# Patient Record
Sex: MALE | Race: WHITE | NOT HISPANIC OR LATINO | Employment: OTHER | URBAN - NONMETROPOLITAN AREA
[De-identification: names, ages, dates, MRNs, and addresses within clinical notes are randomized per-mention and may not be internally consistent; named-entity substitution may affect disease eponyms.]

---

## 2017-01-20 ENCOUNTER — OFFICE VISIT (OUTPATIENT)
Dept: CARDIOLOGY | Facility: CLINIC | Age: 72
End: 2017-01-20

## 2017-01-20 VITALS
HEIGHT: 70 IN | DIASTOLIC BLOOD PRESSURE: 78 MMHG | BODY MASS INDEX: 23.91 KG/M2 | SYSTOLIC BLOOD PRESSURE: 116 MMHG | WEIGHT: 167 LBS | HEART RATE: 52 BPM

## 2017-01-20 DIAGNOSIS — R00.1 SEVERE SINUS BRADYCARDIA: ICD-10-CM

## 2017-01-20 DIAGNOSIS — Z45.010 PACEMAKER BATTERY DEPLETION: Primary | ICD-10-CM

## 2017-01-20 PROCEDURE — 93288 INTERROG EVL PM/LDLS PM IP: CPT | Performed by: INTERNAL MEDICINE

## 2017-01-20 RX ORDER — NIACIN 250 MG
250 TABLET, EXTENDED RELEASE ORAL DAILY
COMMUNITY

## 2017-01-20 NOTE — PROGRESS NOTES
Chief Complaint(s): PM at JOE    History of Present Illness:    The acute complicated chief complaint(s) first occurred in last few months,  is expected in nature, first stage in severity, expected in time of occurrence, happening once times, lasting motnhs at a time, and has been medicated with nothing new, and manifest as a feeling of nothing. It is not worsened with exertion and is not relieved with rest.  It is also occurring in the setting of concomitant severe sinus node dysfunction.      Patient Active Problem List   Diagnosis   • Severe sinus bradycardia   • Pacemaker          Current Outpatient Prescriptions:   •  aspirin 81 MG EC tablet, Take 81 mg by mouth daily., Disp: , Rfl:   •  Multiple Vitamins-Minerals (MULTIVITAMIN ADULT PO), Take  by mouth., Disp: , Rfl:   •  Niacin ER (SLO-NIACIN) 250 MG tablet controlled-release, Take 250 mg by mouth Daily., Disp: , Rfl:   •  Omega-3 Fatty Acids (FISH OIL) 1000 MG capsule capsule, Take  by mouth daily with breakfast., Disp: , Rfl:   No Known Allergies     PFSH:    Denies any alcohol, caffeine or tobacco abuse      ROS:    Cardiov: Denies chest pain, tightness, palpitations, SANDERSON, PND, or edema  Respiratory: Denies dyspnea, cough, hemoptysis, pleuritic chest pain, wheezing or sputum production    Vitals:    01/20/17 1304   BP: 116/78   Pulse: 52   R:                       17    Physical Exam   Pulmonary/Chest:            Lungs: CTA, no wheezing, equal air entry bilaterally, resonant to percussion, PMI non-displaced  Ext: warm, negative edema, all pulses normal     Diagnosis Plan   1. Pacemaker battery depletion  Will plan on generator change in near future   2. Severe sinus bradycardia          The patient's old records including ambulatory rhythm recordings (ECGs, Holter/event monitor) were reviewed and discussed.          Device Check (PM)    Company BTK  Mode DDD-CLS  Lower Rate 50 bpm  Upper rate 130 bpm         Thresholds    % pacing 92  Atrial Pacing 1  Volts @ 0.4 ms  Atrial Sensing 6.2 mV  Atrial Impedence 593 Ohms    % pacing 100  Right Ventricular Pacing 0.6 Volts @ 0.4 ms  Right Ventricular Sensing DEPENDENT mV  Right Ventricular Impedence 628 Ohms      Battery Voltage 2.64 Volts  Longevity JOE    Episodes 0    Reprogramming 0    Comments NORMAL FUNCTION

## 2017-01-24 RX ORDER — CHLORHEXIDINE GLUCONATE 4 G/100ML
SOLUTION TOPICAL DAILY
Qty: 118 ML | Refills: 0 | Status: SHIPPED | OUTPATIENT
Start: 2017-01-24 | End: 2017-01-31

## 2017-01-25 ENCOUNTER — PREP FOR SURGERY (OUTPATIENT)
Dept: CARDIOLOGY | Facility: CLINIC | Age: 72
End: 2017-01-25

## 2017-01-25 DIAGNOSIS — R00.1 BRADYCARDIA: Primary | ICD-10-CM

## 2017-01-25 RX ORDER — SODIUM CHLORIDE 0.9 % (FLUSH) 0.9 %
1-10 SYRINGE (ML) INJECTION AS NEEDED
Status: CANCELLED | OUTPATIENT
Start: 2017-01-25

## 2017-01-31 ENCOUNTER — APPOINTMENT (OUTPATIENT)
Dept: PREADMISSION TESTING | Facility: HOSPITAL | Age: 72
End: 2017-01-31

## 2017-01-31 DIAGNOSIS — R00.1 BRADYCARDIA: ICD-10-CM

## 2017-01-31 LAB
ANION GAP SERPL CALCULATED.3IONS-SCNC: 5 MMOL/L (ref 3–11)
BASOPHILS # BLD AUTO: 0.02 10*3/MM3 (ref 0–0.2)
BASOPHILS NFR BLD AUTO: 0.2 % (ref 0–1)
BUN BLD-MCNC: 19 MG/DL (ref 9–23)
BUN/CREAT SERPL: 19 (ref 7–25)
CALCIUM SPEC-SCNC: 10.1 MG/DL (ref 8.7–10.4)
CHLORIDE SERPL-SCNC: 103 MMOL/L (ref 99–109)
CO2 SERPL-SCNC: 30 MMOL/L (ref 20–31)
CREAT BLD-MCNC: 1 MG/DL (ref 0.6–1.3)
DEPRECATED RDW RBC AUTO: 47.2 FL (ref 37–54)
EOSINOPHIL # BLD AUTO: 0.22 10*3/MM3 (ref 0.1–0.3)
EOSINOPHIL NFR BLD AUTO: 2.7 % (ref 0–3)
ERYTHROCYTE [DISTWIDTH] IN BLOOD BY AUTOMATED COUNT: 13.4 % (ref 11.3–14.5)
GFR SERPL CREATININE-BSD FRML MDRD: 73 ML/MIN/1.73
GLUCOSE BLD-MCNC: 98 MG/DL (ref 70–100)
HCT VFR BLD AUTO: 44.6 % (ref 38.9–50.9)
HGB BLD-MCNC: 15.3 G/DL (ref 13.1–17.5)
IMM GRANULOCYTES # BLD: 0.03 10*3/MM3 (ref 0–0.03)
IMM GRANULOCYTES NFR BLD: 0.4 % (ref 0–0.6)
INR PPP: 1.03
LYMPHOCYTES # BLD AUTO: 1.91 10*3/MM3 (ref 0.6–4.8)
LYMPHOCYTES NFR BLD AUTO: 23.8 % (ref 24–44)
MCH RBC QN AUTO: 33 PG (ref 27–31)
MCHC RBC AUTO-ENTMCNC: 34.3 G/DL (ref 32–36)
MCV RBC AUTO: 96.3 FL (ref 80–99)
MONOCYTES # BLD AUTO: 0.46 10*3/MM3 (ref 0–1)
MONOCYTES NFR BLD AUTO: 5.7 % (ref 0–12)
NEUTROPHILS # BLD AUTO: 5.38 10*3/MM3 (ref 1.5–8.3)
NEUTROPHILS NFR BLD AUTO: 67.2 % (ref 41–71)
PLATELET # BLD AUTO: 234 10*3/MM3 (ref 150–450)
PMV BLD AUTO: 10.1 FL (ref 6–12)
POTASSIUM BLD-SCNC: 4.4 MMOL/L (ref 3.5–5.5)
PROTHROMBIN TIME: 11.2 SECONDS (ref 9.6–11.5)
RBC # BLD AUTO: 4.63 10*6/MM3 (ref 4.2–5.76)
SODIUM BLD-SCNC: 138 MMOL/L (ref 132–146)
WBC NRBC COR # BLD: 8.02 10*3/MM3 (ref 3.5–10.8)

## 2017-01-31 PROCEDURE — 85610 PROTHROMBIN TIME: CPT | Performed by: PHYSICIAN ASSISTANT

## 2017-01-31 PROCEDURE — 85025 COMPLETE CBC W/AUTO DIFF WBC: CPT | Performed by: PHYSICIAN ASSISTANT

## 2017-01-31 PROCEDURE — 36415 COLL VENOUS BLD VENIPUNCTURE: CPT

## 2017-01-31 PROCEDURE — 80048 BASIC METABOLIC PNL TOTAL CA: CPT | Performed by: PHYSICIAN ASSISTANT

## 2017-02-01 ENCOUNTER — HOSPITAL ENCOUNTER (OUTPATIENT)
Facility: HOSPITAL | Age: 72
Discharge: HOME | End: 2017-02-01
Attending: INTERNAL MEDICINE | Admitting: INTERNAL MEDICINE

## 2017-02-01 VITALS
BODY MASS INDEX: 25.44 KG/M2 | RESPIRATION RATE: 7 BRPM | HEART RATE: 51 BPM | WEIGHT: 177.69 LBS | HEIGHT: 70 IN | DIASTOLIC BLOOD PRESSURE: 99 MMHG | OXYGEN SATURATION: 95 % | SYSTOLIC BLOOD PRESSURE: 140 MMHG | TEMPERATURE: 97.9 F

## 2017-02-01 PROBLEM — I48.3 TYPICAL ATRIAL FLUTTER (HCC): Status: ACTIVE | Noted: 2017-02-01

## 2017-02-01 PROBLEM — Z45.010 PACEMAKER AT END OF BATTERY LIFE: Status: ACTIVE | Noted: 2017-02-01

## 2017-02-01 LAB
ANION GAP SERPL CALCULATED.3IONS-SCNC: 0 MMOL/L (ref 3–11)
BUN BLD-MCNC: 17 MG/DL (ref 9–23)
BUN/CREAT SERPL: 18.9 (ref 7–25)
CALCIUM SPEC-SCNC: 8.9 MG/DL (ref 8.7–10.4)
CHLORIDE SERPL-SCNC: 111 MMOL/L (ref 99–109)
CO2 SERPL-SCNC: 29 MMOL/L (ref 20–31)
CREAT BLD-MCNC: 0.9 MG/DL (ref 0.6–1.3)
DEPRECATED RDW RBC AUTO: 47.2 FL (ref 37–54)
ERYTHROCYTE [DISTWIDTH] IN BLOOD BY AUTOMATED COUNT: 13.3 % (ref 11.3–14.5)
GFR SERPL CREATININE-BSD FRML MDRD: 83 ML/MIN/1.73
GLUCOSE BLD-MCNC: 102 MG/DL (ref 70–100)
HCT VFR BLD AUTO: 41.6 % (ref 38.9–50.9)
HGB BLD-MCNC: 14.3 G/DL (ref 13.1–17.5)
MCH RBC QN AUTO: 33.1 PG (ref 27–31)
MCHC RBC AUTO-ENTMCNC: 34.4 G/DL (ref 32–36)
MCV RBC AUTO: 96.3 FL (ref 80–99)
PLATELET # BLD AUTO: 216 10*3/MM3 (ref 150–450)
PMV BLD AUTO: 10 FL (ref 6–12)
POTASSIUM BLD-SCNC: 4.2 MMOL/L (ref 3.5–5.5)
RBC # BLD AUTO: 4.32 10*6/MM3 (ref 4.2–5.76)
SODIUM BLD-SCNC: 140 MMOL/L (ref 132–146)
WBC NRBC COR # BLD: 5.83 10*3/MM3 (ref 3.5–10.8)

## 2017-02-01 PROCEDURE — 25010000002 VANCOMYCIN HCL IN NACL 1.25-0.9 GM/250ML-% SOLUTION: Performed by: INTERNAL MEDICINE

## 2017-02-01 PROCEDURE — 25010000002 ONDANSETRON PER 1 MG: Performed by: INTERNAL MEDICINE

## 2017-02-01 PROCEDURE — 80048 BASIC METABOLIC PNL TOTAL CA: CPT | Performed by: INTERNAL MEDICINE

## 2017-02-01 PROCEDURE — 85027 COMPLETE CBC AUTOMATED: CPT | Performed by: INTERNAL MEDICINE

## 2017-02-01 PROCEDURE — 25010000002 FENTANYL CITRATE (PF) 100 MCG/2ML SOLUTION: Performed by: INTERNAL MEDICINE

## 2017-02-01 PROCEDURE — 99152 MOD SED SAME PHYS/QHP 5/>YRS: CPT | Performed by: INTERNAL MEDICINE

## 2017-02-01 PROCEDURE — C1785 PMKR, DUAL, RATE-RESP: HCPCS

## 2017-02-01 PROCEDURE — 33228 REMV&REPLC PM GEN DUAL LEAD: CPT | Performed by: INTERNAL MEDICINE

## 2017-02-01 PROCEDURE — 25010000002 MIDAZOLAM PER 1 MG: Performed by: INTERNAL MEDICINE

## 2017-02-01 DEVICE — IMPLANTABLE DEVICE
Type: IMPLANTABLE DEVICE | Site: CHEST WALL | Status: FUNCTIONAL
Brand: ETRINSA 8 DR-T

## 2017-02-01 RX ORDER — VANCOMYCIN HYDROCHLORIDE
15 ONCE
Status: COMPLETED | OUTPATIENT
Start: 2017-02-01 | End: 2017-02-01

## 2017-02-01 RX ORDER — SODIUM CHLORIDE 0.9 % (FLUSH) 0.9 %
1-10 SYRINGE (ML) INJECTION AS NEEDED
Status: DISCONTINUED | OUTPATIENT
Start: 2017-02-01 | End: 2017-02-01 | Stop reason: HOSPADM

## 2017-02-01 RX ORDER — LIDOCAINE HYDROCHLORIDE 10 MG/ML
INJECTION, SOLUTION INFILTRATION; PERINEURAL AS NEEDED
Status: DISCONTINUED | OUTPATIENT
Start: 2017-02-01 | End: 2017-02-01 | Stop reason: HOSPADM

## 2017-02-01 RX ORDER — FENTANYL CITRATE 50 UG/ML
INJECTION, SOLUTION INTRAMUSCULAR; INTRAVENOUS AS NEEDED
Status: DISCONTINUED | OUTPATIENT
Start: 2017-02-01 | End: 2017-02-01 | Stop reason: HOSPADM

## 2017-02-01 RX ORDER — ONDANSETRON 2 MG/ML
4 INJECTION INTRAMUSCULAR; INTRAVENOUS EVERY 6 HOURS PRN
Status: DISCONTINUED | OUTPATIENT
Start: 2017-02-01 | End: 2017-02-01 | Stop reason: HOSPADM

## 2017-02-01 RX ORDER — SODIUM CHLORIDE 9 MG/ML
INJECTION, SOLUTION INTRAVENOUS CONTINUOUS PRN
Status: DISCONTINUED | OUTPATIENT
Start: 2017-02-01 | End: 2017-02-01 | Stop reason: HOSPADM

## 2017-02-01 RX ORDER — BUPIVACAINE HYDROCHLORIDE 5 MG/ML
INJECTION, SOLUTION EPIDURAL; INTRACAUDAL AS NEEDED
Status: DISCONTINUED | OUTPATIENT
Start: 2017-02-01 | End: 2017-02-01 | Stop reason: HOSPADM

## 2017-02-01 RX ORDER — HYDROCODONE BITARTRATE AND ACETAMINOPHEN 10; 325 MG/1; MG/1
1 TABLET ORAL EVERY 4 HOURS PRN
Status: DISCONTINUED | OUTPATIENT
Start: 2017-02-01 | End: 2017-02-01 | Stop reason: HOSPADM

## 2017-02-01 RX ORDER — MIDAZOLAM HYDROCHLORIDE 1 MG/ML
INJECTION INTRAMUSCULAR; INTRAVENOUS AS NEEDED
Status: DISCONTINUED | OUTPATIENT
Start: 2017-02-01 | End: 2017-02-01 | Stop reason: HOSPADM

## 2017-02-01 RX ORDER — ONDANSETRON 2 MG/ML
INJECTION INTRAMUSCULAR; INTRAVENOUS AS NEEDED
Status: DISCONTINUED | OUTPATIENT
Start: 2017-02-01 | End: 2017-02-01 | Stop reason: HOSPADM

## 2017-02-01 RX ADMIN — VANCOMYCIN HYDROCHLORIDE 1250 MG: 1 INJECTION, POWDER, LYOPHILIZED, FOR SOLUTION INTRAVENOUS at 08:04

## 2017-02-01 NOTE — H&P
"7:17 AM                                        Updated H & P         CC: PM at JOE, no new complaints since visit of 1/20/17    Visit Vitals   • /95 (BP Location: Right arm, Patient Position: Lying)  Comment: 134/94  LEFT ARM   • Pulse 61   • Temp 97.9 °F (36.6 °C) (Temporal Artery )   • Resp 20   • Ht 70\" (177.8 cm)   • Wt 177 lb 11.1 oz (80.6 kg)   • SpO2 98%   • BMI 25.5 kg/m2       Lungs: CTA no wheezing, equal air entry bilaterally    Chest: no erythema or swelling at PM site    Cor: RRR, physiologic S12, no rubs, gallops or murmurs    Ext: - edema, distal pulses 2+      Impression       Hospital Problem List     * (Principal)Pacemaker at end of battery life    Severe sinus bradycardia                  Plan     We will replace the PM generator.            Progress Notes  Encounter Date: 1/20/2017  Jah Mendoza MD   Cardiology      []Hide copied text  []Hover for attribution information     Chief Complaint(s): PM at JOE     History of Present Illness:     The acute complicated chief complaint(s) first occurred in last few months, is expected in nature, first stage in severity, expected in time of occurrence, happening once times, lasting motnhs at a time, and has been medicated with nothing new, and manifest as a feeling of nothing. It is not worsened with exertion and is not relieved with rest. It is also occurring in the setting of concomitant severe sinus node dysfunction.            Patient Active Problem List   Diagnosis   • Severe sinus bradycardia   • Pacemaker            Current Outpatient Prescriptions:   • aspirin 81 MG EC tablet, Take 81 mg by mouth daily., Disp: , Rfl:   • Multiple Vitamins-Minerals (MULTIVITAMIN ADULT PO), Take by mouth., Disp: , Rfl:   • Niacin ER (SLO-NIACIN) 250 MG tablet controlled-release, Take 250 mg by mouth Daily., Disp: , Rfl:   • Omega-3 Fatty Acids (FISH OIL) 1000 MG capsule capsule, Take by mouth daily with breakfast., Disp: , Rfl:   No Known Allergies "      PFSH:     Denies any alcohol, caffeine or tobacco abuse        ROS:     Cardiov: Denies chest pain, tightness, palpitations, SANDERSON, PND, or edema  Respiratory: Denies dyspnea, cough, hemoptysis, pleuritic chest pain, wheezing or sputum production         Vitals:     01/20/17 1304   BP: 116/78   Pulse: 52   R: 17     Physical Exam   Pulmonary/Chest:          HEENT: NC, AT, PERRLA, EOMI  Lungs: CTA, no wheezing, equal air entry bilaterally, resonant to percussion, PMI non-displaced  Cor: RRR, physiol S12  Chest: No erythema erosion or swelling at PM site  Ext: warm, negative edema, all pulses normal  Neuro: AAO X 3, CN II-XII grossly intact and equal bilat.       Diagnosis Plan   1. Pacemaker battery depletion  Will plan on generator change in near future   2. Severe sinus bradycardia         The patient's old records including ambulatory rhythm recordings (ECGs, Holter/event monitor) were reviewed and discussed.         Device Check (PM)     Company BTK  Mode DDD-CLS  Lower Rate 50 bpm  Upper rate 130 bpm        Thresholds     % pacing 92  Atrial Pacing 1 Volts @ 0.4 ms  Atrial Sensing 6.2 mV  Atrial Impedence 593 Ohms     % pacing 100  Right Ventricular Pacing 0.6 Volts @ 0.4 ms  Right Ventricular Sensing DEPENDENT mV  Right Ventricular Impedence 628 Ohms        Battery Voltage 2.64 Volts  Longevity JOE     Episodes 0     Reprogramming 0     Comments NORMAL FUNCTION

## 2017-02-08 ENCOUNTER — OFFICE VISIT (OUTPATIENT)
Dept: CARDIOLOGY | Facility: CLINIC | Age: 72
End: 2017-02-08

## 2017-02-08 DIAGNOSIS — I48.3 TYPICAL ATRIAL FLUTTER (HCC): ICD-10-CM

## 2017-02-08 DIAGNOSIS — R00.1 SEVERE SINUS BRADYCARDIA: Primary | ICD-10-CM

## 2017-02-08 PROCEDURE — 99024 POSTOP FOLLOW-UP VISIT: CPT | Performed by: INTERNAL MEDICINE

## 2017-02-08 NOTE — PROGRESS NOTES
WOUND CHECK    2017    Tarun Salgado, : 1945    WOUND CHECK    B/P: 146/101 (Sitting)  (Standing)     Pulse: 60    Patient has fever: [] Temperature if indicated: 97.5 degrees F    Wound Location: left infraclavicular     Dressing Removed [x]        Old Dressing Appearance:  Clean, dry [x]                 Old, bloody drainage []                            Moist, serous drainage []                Moist, thick yellow/green drainage []       Wound Appearance: Redness []                  Drainage []                  Culture obtained []        Color: N/A     Consistency: none     Amount: none         Gloves used, wound cleansed with sterile 4x4 and peroxide [x]       MD notified [] MD orders:   Antibiotic started []  If checked, type   Other: pt was instructed to monitor BP and contact PCP if not better    Appointment for follow-up scheduled for 3 months post procedure []    Future Appointments  Date Time Provider Department Center   2017 10:00 AM Jah Mendoza MD [facility] None           Qian Maradiaga MA, 17      MD Signature:______________________________ Completed By/Date:

## 2017-04-21 ENCOUNTER — OFFICE VISIT (OUTPATIENT)
Dept: CARDIOLOGY | Facility: CLINIC | Age: 72
End: 2017-04-21

## 2017-04-21 VITALS — DIASTOLIC BLOOD PRESSURE: 70 MMHG | SYSTOLIC BLOOD PRESSURE: 130 MMHG | HEART RATE: 62 BPM

## 2017-04-21 DIAGNOSIS — I42.9 CARDIOMYOPATHY (HCC): Primary | ICD-10-CM

## 2017-04-21 PROBLEM — R00.1 SEVERE SINUS BRADYCARDIA: Status: ACTIVE | Noted: 2017-04-21

## 2017-04-21 PROBLEM — I48.3 TYPICAL ATRIAL FLUTTER (HCC): Status: ACTIVE | Noted: 2017-04-21

## 2017-04-21 PROCEDURE — 99024 POSTOP FOLLOW-UP VISIT: CPT | Performed by: INTERNAL MEDICINE

## 2017-04-21 NOTE — PROGRESS NOTES
WOUND CHECK    2017    Tarun Salgado, : 1945    Jah Mendoza MD    B/P:  (Sitting) 130 70 (Standing)     Pulse: 62    Patient has fever: [] Temperature if indicated: No fevers noted per patient    Wound Location: left infraclavicular    Dressing Removed []   No dressing present     Old Dressing Appearance:  Clean, dry [x]                 Old, bloody drainage []                            Moist, serous drainage []                Moist, thick yellow/green drainage []       Wound Appearance: Redness []   none               Drainage []   none               Culture obtained []   no     Color:      Consistency:      Amount: none         Gloves used, wound cleansed with sterile 4x4 and peroxide [x]       MD notified [] MD orders:     Antibiotic started []  If checked, type   Other: Patient had his device placed on 17. His incision is completely healed. No redness, fevers, swelling or streaking. He is complaining that this device is sticking out more than the previous one. I explained to him that it looks normal and since it is his second device it may been seen a little more than the previous one. I instructed him to let us know if anything changes and to keep his scheduled f/u appointment in . He denies any issues. He just thought it soul look better by now.    Appointment for follow-up scheduled for 3 months post procedure [x]    Future Appointments  Date Time Provider Department Center   2017 10:00 AM Jah Mendoza MD [facility] None           Tiffanie rCamer RN, 17      MD Signature:______________________________ Completed By/Date:

## 2017-09-08 ENCOUNTER — OFFICE VISIT (OUTPATIENT)
Dept: CARDIOLOGY | Facility: CLINIC | Age: 72
End: 2017-09-08

## 2017-09-08 VITALS
HEIGHT: 70 IN | SYSTOLIC BLOOD PRESSURE: 131 MMHG | HEART RATE: 73 BPM | BODY MASS INDEX: 23.62 KG/M2 | DIASTOLIC BLOOD PRESSURE: 76 MMHG | WEIGHT: 165 LBS

## 2017-09-08 DIAGNOSIS — I48.0 PAROXYSMAL ATRIAL FIBRILLATION (HCC): Primary | ICD-10-CM

## 2017-09-08 DIAGNOSIS — I44.2 COMPLETE HEART BLOCK (HCC): ICD-10-CM

## 2017-09-08 DIAGNOSIS — I48.0 PAROXYSMAL ATRIAL FIBRILLATION (HCC): ICD-10-CM

## 2017-09-08 DIAGNOSIS — R00.1 SEVERE SINUS BRADYCARDIA: Primary | ICD-10-CM

## 2017-09-08 PROCEDURE — 99214 OFFICE O/P EST MOD 30 MIN: CPT | Performed by: INTERNAL MEDICINE

## 2017-09-08 PROCEDURE — 93288 INTERROG EVL PM/LDLS PM IP: CPT | Performed by: INTERNAL MEDICINE

## 2017-09-08 NOTE — PROGRESS NOTES
"Tarun Salgado  1945  [phone]      09/08/2017    [facility] CARDIOLOGY     Keo Ness MD  [address] [place], [place] [id number]    Chief Complaint   Patient presents with   • Slow Heart Rate       Problem List:   1. Severe Bradycardia/CHB   A. PPM implant   B. Generator change 2/1/17 - Dr. Mendoza (Biotronik)  2. Paroxysmal Atrial Fibrillation   A. CHADSVasc = 1      Allergies  No Known Allergies    Current Medications    Current Outpatient Prescriptions:   •  aspirin 81 MG EC tablet, Take 81 mg by mouth daily., Disp: , Rfl:   •  Multiple Vitamins-Minerals (MULTIVITAMIN ADULT PO), Take  by mouth., Disp: , Rfl:   •  Niacin ER (SLO-NIACIN) 250 MG tablet controlled-release, Take 250 mg by mouth Daily., Disp: , Rfl:   •  Omega-3 Fatty Acids (FISH OIL) 1000 MG capsule capsule, Take 2,400 mg by mouth Daily With Breakfast., Disp: , Rfl:     History of Present Illness   HPI    Pt presents for follow up of bradycardia with PM check. He is a previous patient of Dr. Mendoza with history of CHB. He recently had a generator change in February. Since then, pt denies any palpitations , SOB, CP, LH, and dizziness. Denies any hospitalizations, ER visits, bleeding, or TIA/CVA symptoms. Overall feels well. He exercises reguarly. Today, on his device check, it shows that he has 4% afib, which is a new diagnosis for him. He did not feel any palpations. He denies history CAD, CVA, DM, HTN.     ROS:  General:  Denies fatigue, weight gain or loss  Cardiovascular:  Denies CP, PND, syncope, near syncope, edema or palpitations.  Pulmonary:  Denies SANDERSON, cough, or wheezing      Vitals:    09/08/17 1149   BP: 131/76   BP Location: Left arm   Patient Position: Sitting   Pulse: 73   Weight: 165 lb (74.8 kg)   Height: 70\" (177.8 cm)     PE:  General: NAD  Neck: no JVD, no carotid bruits, no TM  Heart RRR, NL S1, S2, S4 present, no rubs, murmurs  Lungs: CTA, no wheezes, rhonchi, or rales  Abd: " soft, non-tender, NL BS  Ext: No musculoskeletal deformities, no edema, cyanosis, or clubbing  Psych: normal mood and affect    Diagnostic Data:    PM check: normal function, 100% V paced. 4% afib Longest episode 4 hours. Patient asymptomatic    Procedures    1. Severe sinus bradycardia    2. Complete heart block    3. Paroxysmal atrial fibrillation          Plan:  1) PAF- 4% mode switched. Asymptomatic. Also, would check echocardiogram due to new onset atrial fibrillation to evaluate for LV function and valvular abnormalities.   Continue present medications.   2) Anticoagulation CHADsvasc = 1: 2.8% per year stroke risk. Dr. Alexander had a long discussion with the patient on anticoagulation. He recommends to start on Eliquis 5 mg BID. However, the patient is hesitant about taking blood thinners at this time. He will stick with ASA for now and let us know if he decides differently. He understands risks of not being on a blood thinner.   3) CHB - normal PM funciton  Wt loss, exercise, salt reduction    F/up in 6 months    Scribed for Greg Alexander MD by Palak Melchor PA-C. 9/8/2017  12:28 PM     IGreg MD, personally performed the services described in this documentation as scribed by the above named individual in my presence, and it is both accurate and complete.  9/8/2017  12:29 PM

## 2017-09-25 ENCOUNTER — HOSPITAL ENCOUNTER (OUTPATIENT)
Dept: CARDIOLOGY | Facility: HOSPITAL | Age: 72
Discharge: HOME | End: 2017-09-25
Attending: INTERNAL MEDICINE | Admitting: INTERNAL MEDICINE

## 2017-09-25 VITALS
DIASTOLIC BLOOD PRESSURE: 98 MMHG | BODY MASS INDEX: 23.62 KG/M2 | WEIGHT: 165 LBS | HEIGHT: 70 IN | SYSTOLIC BLOOD PRESSURE: 140 MMHG

## 2017-09-25 DIAGNOSIS — I48.0 PAROXYSMAL ATRIAL FIBRILLATION (HCC): ICD-10-CM

## 2017-09-25 LAB
BH CV ECHO MEAS - AO MAX PG (FULL): -0.22 MMHG
BH CV ECHO MEAS - AO MAX PG: 7.5 MMHG
BH CV ECHO MEAS - AO MEAN PG (FULL): 0 MMHG
BH CV ECHO MEAS - AO MEAN PG: 4 MMHG
BH CV ECHO MEAS - AO ROOT AREA (BSA CORRECTED): 1.8
BH CV ECHO MEAS - AO ROOT AREA: 9.1 CM^2
BH CV ECHO MEAS - AO ROOT DIAM: 3.4 CM
BH CV ECHO MEAS - AO V2 MAX: 137 CM/SEC
BH CV ECHO MEAS - AO V2 MEAN: 97.5 CM/SEC
BH CV ECHO MEAS - AO V2 VTI: 28 CM
BH CV ECHO MEAS - AVA(I,A): 3.2 CM^2
BH CV ECHO MEAS - AVA(I,D): 3.2 CM^2
BH CV ECHO MEAS - AVA(V,A): 3.2 CM^2
BH CV ECHO MEAS - AVA(V,D): 3.2 CM^2
BH CV ECHO MEAS - BSA(HAYCOCK): 1.9 M^2
BH CV ECHO MEAS - BSA: 1.9 M^2
BH CV ECHO MEAS - BZI_BMI: 23.7 KILOGRAMS/M^2
BH CV ECHO MEAS - BZI_METRIC_HEIGHT: 177.8 CM
BH CV ECHO MEAS - BZI_METRIC_WEIGHT: 74.8 KG
BH CV ECHO MEAS - CONTRAST EF (2CH): 59.5 ML/M^2
BH CV ECHO MEAS - CONTRAST EF 4CH: 54.4 ML/M^2
BH CV ECHO MEAS - EDV(CUBED): 88.1 ML
BH CV ECHO MEAS - EDV(MOD-SP2): 116 ML
BH CV ECHO MEAS - EDV(MOD-SP4): 136 ML
BH CV ECHO MEAS - EDV(TEICH): 90.1 ML
BH CV ECHO MEAS - EF(CUBED): 50.5 %
BH CV ECHO MEAS - EF(MOD-SP2): 59.5 %
BH CV ECHO MEAS - EF(MOD-SP4): 54 %
BH CV ECHO MEAS - EF(TEICH): 42.7 %
BH CV ECHO MEAS - ESV(CUBED): 43.6 ML
BH CV ECHO MEAS - ESV(MOD-SP2): 47 ML
BH CV ECHO MEAS - ESV(MOD-SP4): 62 ML
BH CV ECHO MEAS - ESV(TEICH): 51.6 ML
BH CV ECHO MEAS - FS: 20.9 %
BH CV ECHO MEAS - IVS/LVPW: 1.2
BH CV ECHO MEAS - IVSD: 1.3 CM
BH CV ECHO MEAS - LA DIMENSION: 3.9 CM
BH CV ECHO MEAS - LA/AO: 1.1
BH CV ECHO MEAS - LAT PEAK E' VEL: 10.1 CM/SEC
BH CV ECHO MEAS - LV DIASTOLIC VOL/BSA (35-75): 70.7 ML/M^2
BH CV ECHO MEAS - LV MASS(C)D: 201.8 GRAMS
BH CV ECHO MEAS - LV MASS(C)DI: 104.9 GRAMS/M^2
BH CV ECHO MEAS - LV MAX PG: 7.7 MMHG
BH CV ECHO MEAS - LV MEAN PG: 4 MMHG
BH CV ECHO MEAS - LV SYSTOLIC VOL/BSA (12-30): 32.2 ML/M^2
BH CV ECHO MEAS - LV V1 MAX: 139 CM/SEC
BH CV ECHO MEAS - LV V1 MEAN: 93 CM/SEC
BH CV ECHO MEAS - LV V1 VTI: 28.5 CM
BH CV ECHO MEAS - LVIDD: 4.5 CM
BH CV ECHO MEAS - LVIDS: 3.5 CM
BH CV ECHO MEAS - LVLD AP2: 8.4 CM
BH CV ECHO MEAS - LVLD AP4: 9.2 CM
BH CV ECHO MEAS - LVLS AP2: 7.6 CM
BH CV ECHO MEAS - LVLS AP4: 7.6 CM
BH CV ECHO MEAS - LVOT AREA (M): 3.1 CM^2
BH CV ECHO MEAS - LVOT AREA: 3.1 CM^2
BH CV ECHO MEAS - LVOT DIAM: 2 CM
BH CV ECHO MEAS - LVPWD: 1.3 CM
BH CV ECHO MEAS - MED PEAK E' VEL: 5.46 CM/SEC
BH CV ECHO MEAS - MV A MAX VEL: 41.3 CM/SEC
BH CV ECHO MEAS - MV E MAX VEL: 65.4 CM/SEC
BH CV ECHO MEAS - MV E/A: 1.6
BH CV ECHO MEAS - PA ACC SLOPE: 644 CM/SEC^2
BH CV ECHO MEAS - PA ACC TIME: 0.13 SEC
BH CV ECHO MEAS - PA MAX PG: 3.4 MMHG
BH CV ECHO MEAS - PA PR(ACCEL): 20.5 MMHG
BH CV ECHO MEAS - PA V2 MAX: 92.3 CM/SEC
BH CV ECHO MEAS - SI(AO): 132.2 ML/M^2
BH CV ECHO MEAS - SI(CUBED): 23.1 ML/M^2
BH CV ECHO MEAS - SI(LVOT): 46.5 ML/M^2
BH CV ECHO MEAS - SI(MOD-SP2): 35.9 ML/M^2
BH CV ECHO MEAS - SI(MOD-SP4): 38.5 ML/M^2
BH CV ECHO MEAS - SI(TEICH): 20 ML/M^2
BH CV ECHO MEAS - SV(AO): 254.2 ML
BH CV ECHO MEAS - SV(CUBED): 44.5 ML
BH CV ECHO MEAS - SV(LVOT): 89.5 ML
BH CV ECHO MEAS - SV(MOD-SP2): 69 ML
BH CV ECHO MEAS - SV(MOD-SP4): 74 ML
BH CV ECHO MEAS - SV(TEICH): 38.5 ML
BH CV ECHO MEAS - TAPSE (>1.6): 2.7 CM2
BH CV VAS BP RIGHT ARM: NORMAL MMHG
BH CV XLRA - RV BASE: 3.8 CM
BH CV XLRA - RV LENGTH: 9 CM
BH CV XLRA - RV MID: 3.3 CM
BH CV XLRA - TDI S': 13.2 CM/SEC
E/E' RATIO: 6.5
LEFT ATRIUM VOLUME INDEX: 39.1 ML/M2
LV EF 2D ECHO EST: 46 %

## 2017-09-25 PROCEDURE — 93306 TTE W/DOPPLER COMPLETE: CPT

## 2017-09-25 PROCEDURE — 93306 TTE W/DOPPLER COMPLETE: CPT | Performed by: INTERNAL MEDICINE

## 2017-09-28 ENCOUNTER — TELEPHONE (OUTPATIENT)
Dept: CARDIOLOGY | Facility: CLINIC | Age: 72
End: 2017-09-28

## 2017-10-02 DIAGNOSIS — R06.02 SHORTNESS OF BREATH: ICD-10-CM

## 2017-10-02 DIAGNOSIS — Z95.0 PACEMAKER: ICD-10-CM

## 2017-10-02 DIAGNOSIS — I48.0 PAROXYSMAL ATRIAL FIBRILLATION (HCC): Primary | ICD-10-CM

## 2017-10-02 DIAGNOSIS — I48.91 NEW ONSET A-FIB (HCC): ICD-10-CM

## 2017-10-02 DIAGNOSIS — I48.3 TYPICAL ATRIAL FLUTTER (HCC): ICD-10-CM

## 2017-10-02 DIAGNOSIS — I44.2 COMPLETE HEART BLOCK (HCC): ICD-10-CM

## 2017-10-02 DIAGNOSIS — R00.1 SEVERE SINUS BRADYCARDIA: ICD-10-CM

## 2017-10-25 ENCOUNTER — HOSPITAL ENCOUNTER (OUTPATIENT)
Dept: CARDIOLOGY | Facility: HOSPITAL | Age: 72
Discharge: HOME | End: 2017-10-25
Attending: INTERNAL MEDICINE

## 2017-10-25 ENCOUNTER — HOSPITAL ENCOUNTER (OUTPATIENT)
Dept: CARDIOLOGY | Facility: HOSPITAL | Age: 72
Discharge: HOME | End: 2017-10-25
Attending: INTERNAL MEDICINE | Admitting: INTERNAL MEDICINE

## 2017-10-25 VITALS
BODY MASS INDEX: 23.62 KG/M2 | DIASTOLIC BLOOD PRESSURE: 80 MMHG | HEART RATE: 61 BPM | HEIGHT: 70 IN | SYSTOLIC BLOOD PRESSURE: 140 MMHG | WEIGHT: 165 LBS

## 2017-10-25 DIAGNOSIS — R06.02 SHORTNESS OF BREATH: ICD-10-CM

## 2017-10-25 LAB
BH CV STRESS BP STAGE 1: NORMAL
BH CV STRESS BP STAGE 2: NORMAL
BH CV STRESS BP STAGE 3: NORMAL
BH CV STRESS DURATION MIN STAGE 1: 3
BH CV STRESS DURATION MIN STAGE 2: 3
BH CV STRESS DURATION MIN STAGE 3: 3
BH CV STRESS DURATION MIN STAGE 4: 2
BH CV STRESS DURATION SEC STAGE 1: 0
BH CV STRESS DURATION SEC STAGE 2: 0
BH CV STRESS DURATION SEC STAGE 3: 0
BH CV STRESS DURATION SEC STAGE 4: 1
BH CV STRESS GRADE STAGE 1: 10
BH CV STRESS GRADE STAGE 2: 12
BH CV STRESS GRADE STAGE 3: 14
BH CV STRESS GRADE STAGE 4: 16
BH CV STRESS HR STAGE 1: 91
BH CV STRESS HR STAGE 2: 98
BH CV STRESS HR STAGE 3: 116
BH CV STRESS HR STAGE 4: 127
BH CV STRESS METS STAGE 1: 5
BH CV STRESS METS STAGE 2: 7.5
BH CV STRESS METS STAGE 3: 10
BH CV STRESS METS STAGE 4: 13.5
BH CV STRESS O2 STAGE 1: 97
BH CV STRESS O2 STAGE 2: 97
BH CV STRESS O2 STAGE 3: 98
BH CV STRESS O2 STAGE 4: 98
BH CV STRESS PROTOCOL 1: NORMAL
BH CV STRESS RECOVERY BP: NORMAL MMHG
BH CV STRESS RECOVERY HR: 65 BPM
BH CV STRESS RECOVERY O2: 97 %
BH CV STRESS SPEED STAGE 1: 1.7
BH CV STRESS SPEED STAGE 2: 2.5
BH CV STRESS SPEED STAGE 3: 3.4
BH CV STRESS SPEED STAGE 4: 4.2
BH CV STRESS STAGE 1: 1
BH CV STRESS STAGE 2: 2
BH CV STRESS STAGE 3: 3
BH CV STRESS STAGE 4: 4
LV EF NUC BP: 67 %
MAXIMAL PREDICTED HEART RATE: 146 BPM
PERCENT MAX PREDICTED HR: 93.15 %
STRESS BASELINE BP: NORMAL MMHG
STRESS BASELINE HR: 60 BPM
STRESS O2 SAT REST: 97 %
STRESS PERCENT HR: 110 %
STRESS POST ESTIMATED WORKLOAD: 13.4 METS
STRESS POST EXERCISE DUR MIN: 11 MIN
STRESS POST EXERCISE DUR SEC: 1 SEC
STRESS POST O2 SAT PEAK: 98 %
STRESS POST PEAK BP: NORMAL MMHG
STRESS POST PEAK HR: 136 BPM
STRESS TARGET HR: 124 BPM

## 2017-10-25 PROCEDURE — 0 TECHNETIUM SESTAMIBI: Performed by: INTERNAL MEDICINE

## 2017-10-25 PROCEDURE — A9500 TC99M SESTAMIBI: HCPCS | Performed by: INTERNAL MEDICINE

## 2017-10-25 PROCEDURE — 78452 HT MUSCLE IMAGE SPECT MULT: CPT

## 2017-10-25 PROCEDURE — 93017 CV STRESS TEST TRACING ONLY: CPT

## 2017-10-25 PROCEDURE — 93018 CV STRESS TEST I&R ONLY: CPT | Performed by: INTERNAL MEDICINE

## 2017-10-25 PROCEDURE — 78452 HT MUSCLE IMAGE SPECT MULT: CPT | Performed by: INTERNAL MEDICINE

## 2017-10-25 RX ADMIN — TECHNETIUM TC-99M SESTAMIBI 1 DOSE: 1 INJECTION INTRAVENOUS at 10:25

## 2017-10-25 RX ADMIN — TECHNETIUM TC-99M SESTAMIBI 1 DOSE: 1 INJECTION INTRAVENOUS at 12:22

## 2017-11-01 ENCOUNTER — TELEPHONE (OUTPATIENT)
Dept: CARDIOLOGY | Facility: CLINIC | Age: 72
End: 2017-11-01

## 2017-11-01 NOTE — TELEPHONE ENCOUNTER
Stress test is normal and pt aware. He wants to know if he can exercise with target Heart rate up to 150? Pt has PPM for severe sinus mike/ CHB and Paroxysmal Atrial Fibrillation. Anticoagulation CHADsvasc = 1, Pt refuses Eliquis at this time and wants to continue with ASA for now. Last PPM check showed 4% Afib with longest episode being 4 hrs. Please advise.

## 2018-03-16 ENCOUNTER — TELEPHONE (OUTPATIENT)
Dept: CARDIOLOGY | Facility: CLINIC | Age: 73
End: 2018-03-16

## 2018-03-23 ENCOUNTER — OFFICE VISIT (OUTPATIENT)
Dept: CARDIOLOGY | Facility: CLINIC | Age: 73
End: 2018-03-23

## 2018-03-23 VITALS
HEART RATE: 50 BPM | DIASTOLIC BLOOD PRESSURE: 80 MMHG | BODY MASS INDEX: 24.34 KG/M2 | HEIGHT: 70 IN | WEIGHT: 170 LBS | SYSTOLIC BLOOD PRESSURE: 120 MMHG

## 2018-03-23 DIAGNOSIS — I44.2 COMPLETE HEART BLOCK (HCC): Primary | ICD-10-CM

## 2018-03-23 DIAGNOSIS — I48.0 PAROXYSMAL ATRIAL FIBRILLATION (HCC): ICD-10-CM

## 2018-03-23 PROCEDURE — 93280 PM DEVICE PROGR EVAL DUAL: CPT | Performed by: INTERNAL MEDICINE

## 2018-03-23 PROCEDURE — 99213 OFFICE O/P EST LOW 20 MIN: CPT | Performed by: INTERNAL MEDICINE

## 2018-03-23 NOTE — PROGRESS NOTES
Tarun Salgado  1945  [phone]      03/23/2018    [facility] CARDIOLOGY     James Zuniga MD  [address] [place], [place] [address]    Chief Complaint   Patient presents with   • Slow Heart Rate         Problem List:   1. Severe Bradycardia/CHB              A. PPM implant              B. Generator change 2/1/17 - Dr. Mendoza (Biotronik)  2. Paroxysmal Atrial Fibrillation              A. CHADSVasc = 1   B. Echo 9/17: LVEF 48%, Mild MR/TR   C.Cardiolyte test 9/17: Abnormal myocardial perfusion scan showing fixed defect involving the basilar anteroseptal segment consistent with bundle branch block/pacing. Myocardial perfusion imaging   indicates no evidence of ischemia.Left ventricular ejection fraction is normal (Calculated EF = 67%).      Allergies  No Known Allergies    Current Medications    Current Outpatient Prescriptions:   •  aspirin 81 MG EC tablet, Take 81 mg by mouth daily., Disp: , Rfl:   •  Multiple Vitamins-Minerals (MULTIVITAMIN ADULT PO), Take  by mouth., Disp: , Rfl:   •  Niacin ER (SLO-NIACIN) 250 MG tablet controlled-release, Take 250 mg by mouth Daily., Disp: , Rfl:   •  Omega-3 Fatty Acids (FISH OIL) 1000 MG capsule capsule, Take 2,400 mg by mouth Daily With Breakfast., Disp: , Rfl:     History of Present Illness   HPI    Pt presents for follow up of bradycardia/AF. Since the pt has seen us in clinic last, pt denies any syncope, SOB, CP, LH, and dizziness. Denies any hospitalizations, ER visits, bleeding, or TIA/CVA symptoms. Overall feels well. Pt with a lot of questions regarding AF/results of echo and stress test.     ROS:  General:  Denies fatigue, weight gain or loss  Cardiovascular:  Denies CP, PND, syncope, near syncope, edema or palpitations.  Pulmonary:  Denies SANDERSON, cough, or wheezing    Vitals:    03/23/18 1053   BP: 120/80   BP Location: Left arm   Patient Position: Sitting   Pulse: 50   Weight: 77.1 kg (170 lb)   Height: 177.8 cm  "(70\")       PE:  General: NAD  Neck: no JVD, no carotid bruits, no TM  Heart RRR, NL S1, S2,  no rubs, murmurs  Lungs: CTA, no wheezes, rhonchi, or rales  Abd: soft, non-tender, NL BS  Ext: No musculoskeletal deformities, no edema, cyanosis, or clubbing  Psych: normal mood and affect    Diagnostic Data:  Procedures      1. Complete heart block    2. Paroxysmal atrial fibrillation        PM Interrogation: NL PM fxn, Nl battery fxn, 7% AF, 100% RV paced     Plan:  1) Bradycardia/CHB  Continue present medications. NL pacemaker function.  2) Anticoagulation will be CHADVASC 2 in 8/17  Long discussion with pt for need of NOAC. Pt with think about NOAC. Will stay on ASA for now      F/up in 8 months      "

## 2018-04-06 ENCOUNTER — TELEPHONE (OUTPATIENT)
Dept: CARDIOLOGY | Facility: CLINIC | Age: 73
End: 2018-04-06

## 2018-04-16 ENCOUNTER — TELEPHONE (OUTPATIENT)
Dept: CARDIOLOGY | Facility: CLINIC | Age: 73
End: 2018-04-16

## 2018-04-16 NOTE — TELEPHONE ENCOUNTER
----- Message from Tarun Salgado sent at 4/14/2018 11:04 AM EDT -----  Regarding: Referral Request  Contact: [phone]  Dr. Alexander,  Late Friday evening and the early morning on Saturday I spent at the [facility] in the Emergency Room and the Cardiac Unit.  Friday evening about 7:30pm my head started to pain very bad and when I tried to breath, my chest hurt.  I took 2 regular aspirin and after a couple of hours my chest got tighter and breathing was very difficult and hurt.  Breathing deep did help my head pain, but I could not handle the pain in my chest.  At 11:30pm, I went to the Emergency Room because the Walk-in Clinics were closed.  The Emergency Room Doctor took several blood samples and an X-ray of my lungs (to check for blood clots)  He had me taken the the Cardiac Unit.  There, Dr. Seay reviewed the X-ray and the  blood samples.  He said there was no indication of a blood clot and the cardiac markers (troponin) were slightly elevated at 0.169 (first one) and 0.167 (second one).  The d dimer (a marker for blood clot in my lungs) was negative at 0.36.  He let me  go home providing I contact you ASAP.  My breathing has improved but still hurts when I breath deep and the head pain is gone.  I have not taken Eliquis because [facility].  [facility] came [facility] and said they requested [facility] and I was last told that they had not received them.  Once I get the prescription, I will start taking Eliquis per your recommendation.  Please advise regarding an appointment.  Thank you.

## 2018-04-24 ENCOUNTER — TELEPHONE (OUTPATIENT)
Dept: CARDIOLOGY | Facility: CLINIC | Age: 73
End: 2018-04-24

## 2018-04-24 NOTE — TELEPHONE ENCOUNTER
Called pt per GFJAGDISH and [place] william on 4/14/2018. Pt to start on Eliquis 5 mg 1 BID. Pt aware and he is going to call [facility] and find out what [facility] number to send rx to and call us back.

## 2018-04-25 NOTE — TELEPHONE ENCOUNTER
They need documentation why pt can't take Pradaxa instead of Eliquis. I could not find any reactions to Pradaxa in the past. Please advise.

## 2018-04-26 NOTE — TELEPHONE ENCOUNTER
PT called back and said that what the [facility] told me about unable to get Eliquis is False. [facility]. He requested I call in the medication to [facility] in [place] until he gets it all straightened out.

## 2018-06-25 ENCOUNTER — TELEPHONE (OUTPATIENT)
Dept: CARDIOLOGY | Facility: CLINIC | Age: 73
End: 2018-06-25

## 2018-06-25 NOTE — TELEPHONE ENCOUNTER
----- Message from Tarun Salgado sent at 6/24/2018 10:40 AM EDT -----  Regarding: Non-Urgent Medical Question  Contact: [phone]  I have a large bruise on my left inter thigh that appears to be getting larger. I have had it since Mon., 6/18.  I am taking Pradaxa. What can I do to resolve it ?  Do I need an appointment?  Please advise. Thank you. Tarun Salgado. Ph: [phone]            I spoke with the patient on the phone. I answered all of his questions.

## 2018-10-09 ENCOUNTER — DOCUMENTATION (OUTPATIENT)
Dept: CARDIOLOGY | Facility: CLINIC | Age: 73
End: 2018-10-09

## 2018-12-14 ENCOUNTER — OFFICE VISIT (OUTPATIENT)
Dept: CARDIOLOGY | Facility: CLINIC | Age: 73
End: 2018-12-14

## 2018-12-14 VITALS
SYSTOLIC BLOOD PRESSURE: 124 MMHG | WEIGHT: 174 LBS | DIASTOLIC BLOOD PRESSURE: 84 MMHG | HEIGHT: 70 IN | HEART RATE: 60 BPM | BODY MASS INDEX: 24.91 KG/M2

## 2018-12-14 DIAGNOSIS — I48.20 CHRONIC ATRIAL FIBRILLATION (HCC): ICD-10-CM

## 2018-12-14 DIAGNOSIS — I44.2 COMPLETE HEART BLOCK (HCC): Primary | ICD-10-CM

## 2018-12-14 PROCEDURE — 93280 PM DEVICE PROGR EVAL DUAL: CPT | Performed by: INTERNAL MEDICINE

## 2018-12-14 PROCEDURE — 99213 OFFICE O/P EST LOW 20 MIN: CPT | Performed by: INTERNAL MEDICINE

## 2018-12-14 RX ORDER — DABIGATRAN ETEXILATE 150 MG/1
150 CAPSULE ORAL 2 TIMES DAILY
COMMUNITY

## 2018-12-14 NOTE — PROGRESS NOTES
Tarun Salgado  1945  [phone]      12/14/2018      [facility] CARDIOLOGY     James Zuniga MD  [address] [place], [place] [id number]    Chief Complaint   Patient presents with   • Complete heart block (CMS/HCC)   • Atrial Fibrillation       Problem List:   1. Severe Bradycardia/CHB              A. PPM implant              B. Generator change 2/1/17 - Dr. Mendoza (Biotronik)  2. Paroxysmal Atrial Fibrillation              A. CHADSVasc = 1              B. Echo 9/17: LVEF 48%, Mild MR/TR              C.Cardiolyte test 9/17: Abnormal myocardial perfusion scan showing fixed defect involving the basilar anteroseptal segment                 consistent with bundle branch block/pacing. Myocardial perfusion imaging indicates no evidence of ischemia.Left                   ventricular ejection fraction is normal (Calculated EF = 67%).               D. CAF     Allergies  No Known Allergies    Current Medications    Current Outpatient Medications:   •  aspirin 81 MG EC tablet, Take 81 mg by mouth daily., Disp: , Rfl:   •  dabigatran etexilate (PRADAXA) 150 MG capsu, Take 150 mg by mouth 2 (Two) Times a Day., Disp: , Rfl:   •  Multiple Vitamins-Minerals (MULTIVITAMIN ADULT PO), Take  by mouth., Disp: , Rfl:   •  Niacin ER (SLO-NIACIN) 250 MG tablet controlled-release, Take 250 mg by mouth Daily., Disp: , Rfl:   •  Omega-3 Fatty Acids (FISH OIL) 1000 MG capsule capsule, Take 2,400 mg by mouth Daily With Breakfast., Disp: , Rfl:     History of Present Illness     Pt presents for follow up of AF/AVB .Since the pt has seen us in clinic last, pt denies any syncope, SOB, CP, LH, and dizziness. Denies bleeding, or TIA/CVA symptoms. Overall feels well. Exercises daily with no complaints. Seen in ER 4/14/18 due to SOB that resolved.     ROS:  General:  Denies fatigue, weight gain or loss  Cardiovascular:  Denies CP, PND, syncope, near syncope, edema or palpitations.  Pulmonary:   "Denies SANDERSON, cough, or wheezing    Vitals:    12/14/18 1335   BP: 124/84   BP Location: Right arm   Patient Position: Sitting   Pulse: 60   Weight: 78.9 kg (174 lb)   Height: 177.8 cm (70\")       PE:  General: NAD  Neck: no JVD, no carotid bruits, no TM  Heart: RRR, NL S1, S2, no rubs, murmurs  Lungs: CTA, no wheezes, rhonchi, or rales  Abd: soft, non-tender, NL BS  Ext: No musculoskeletal deformities, no edema, cyanosis, or clubbing  Psych: normal mood and affect    Diagnostic Data:  Procedures      1. Complete heart block (CMS/HCC)    2. Chronic atrial fibrillation (CMS/HCC)        PM Interrogation: NL PM fxn, Nl battery fxn, 100% AF, 100% RV paced      Plan:  1) CAF: now CAF pt asymptomatic HR controlled on pradaxa  2) CHB  Continue present medications. NL pacemaker function.      F/up in 9 months      "

## 2019-08-28 ENCOUNTER — TELEPHONE (OUTPATIENT)
Dept: CARDIOLOGY | Facility: CLINIC | Age: 74
End: 2019-08-28

## 2019-08-28 NOTE — TELEPHONE ENCOUNTER
Okay to stop aspirin 7 days prior to the procedure.  Stop the Pradaxa 2 days prior to the procedure.  No need to interrogate the pacemaker or make any changes.  Can Always Place, Magnet over the pacemaker during the procedure if need be.

## 2019-08-28 NOTE — TELEPHONE ENCOUNTER
Dr. Salo Snow is requesting clearance for the patient to have a  blepharoplasty on 9/17/19.He would like the patient to stop taking Aspirin and Pradaxa 7 days prior to the procedure. He would also like any recommendations in regards to his pacemaker if needed.            [phone]

## 2019-11-01 ENCOUNTER — OFFICE VISIT (OUTPATIENT)
Dept: CARDIOLOGY | Facility: CLINIC | Age: 74
End: 2019-11-01

## 2019-11-01 VITALS
HEART RATE: 98 BPM | WEIGHT: 168 LBS | HEIGHT: 70 IN | DIASTOLIC BLOOD PRESSURE: 78 MMHG | SYSTOLIC BLOOD PRESSURE: 144 MMHG | BODY MASS INDEX: 24.05 KG/M2

## 2019-11-01 DIAGNOSIS — I48.21 PERMANENT ATRIAL FIBRILLATION (HCC): ICD-10-CM

## 2019-11-01 DIAGNOSIS — I44.2 COMPLETE HEART BLOCK (HCC): Primary | ICD-10-CM

## 2019-11-01 PROCEDURE — 93280 PM DEVICE PROGR EVAL DUAL: CPT | Performed by: INTERNAL MEDICINE

## 2019-11-01 PROCEDURE — 99213 OFFICE O/P EST LOW 20 MIN: CPT | Performed by: INTERNAL MEDICINE

## 2019-11-01 NOTE — PROGRESS NOTES
Tarun Salgado  1945  [phone]      11/01/2019      [facility] CARDIOLOGY     James Zuniga MD  [address] [place], [place] [id number]    Chief Complaint   Patient presents with   • Cardiomyopathy       Problem List:  1. Severe Bradycardia/CHB              A. PPM implant              B. Generator change 2/1/17 - Dr. Mendoza (Biotronik)  2. Permanent Atrial Fibrillation              A. CHADSVasc = 1              B. Echo 9/17: LVEF 48%, Mild MR/TR              C.Cardiolyte test 9/17: Abnormal myocardial perfusion scan showing fixed defect involving the basilar anteroseptal  segment consistent with bundle branch block/pacing. Myocardial perfusion imaging indicates no evidence of  ischemia.Left ventricular ejection fraction is normal (Calculated EF = 67%).               D. CAF     Allergies  No Known Allergies    Current Medications    Current Outpatient Medications:   •  aspirin 81 MG EC tablet, Take 81 mg by mouth daily., Disp: , Rfl:   •  dabigatran etexilate (PRADAXA) 150 MG capsu, Take 150 mg by mouth 2 (Two) Times a Day., Disp: , Rfl:   •  Multiple Vitamins-Minerals (MULTIVITAMIN ADULT PO), Take  by mouth., Disp: , Rfl:   •  Niacin ER (SLO-NIACIN) 250 MG tablet controlled-release, Take 250 mg by mouth Daily., Disp: , Rfl:   •  Omega-3 Fatty Acids (FISH OIL) 1000 MG capsule capsule, Take 2,400 mg by mouth Daily With Breakfast., Disp: , Rfl:     History of Present Illness     Pt presents for follow up of SSS/PAF/AVB.Since the pt has seen us in clinic last, pt denies any syncope, SOB, CP, LH, and dizziness. Denies any hospitalizations, ER visits, bleeding, or TIA/CVA symptoms. Overall feels well. Still exercises 6 days per week.    ROS:  General:  Denies fatigue, weight gain or loss  Cardiovascular:  Denies CP, PND, syncope, near syncope, edema or palpitations.  Pulmonary:  Denies SANDERSON, cough, or wheezing    Vitals:    11/01/19 1124   BP: 144/78   BP Location:  "Right arm   Patient Position: Sitting   Pulse: 98   Weight: 76.2 kg (168 lb)   Height: 177.8 cm (70\")       PE:  General: NAD  Neck: no JVD, no carotid bruits, no TM  Heart: RRR, NL S1, S2, no rubs, murmurs  Lungs: CTA, no wheezes, rhonchi, or rales  Abd: soft, non-tender, NL BS  Ext: No musculoskeletal deformities, no edema, cyanosis, or clubbing  Psych: normal mood and affect    Diagnostic Data:  Procedures      1. Complete heart block (CMS/HCC)    2. Permanent atrial fibrillation        PM Interrogation: NL PM fxn, Nl battery fxn, 99% af/afl, 100% RV paced     Plan:  1) AVB/bradycardia: NL PM fxn   Continue present medications. NL pacemaker function.  2) Anticoagulation  Continue NOAC. Stop ASA      F/up in 12 months      "

## 2021-01-08 ENCOUNTER — OFFICE VISIT (OUTPATIENT)
Dept: CARDIOLOGY | Facility: CLINIC | Age: 76
End: 2021-01-08

## 2021-01-08 VITALS
SYSTOLIC BLOOD PRESSURE: 142 MMHG | HEART RATE: 70 BPM | OXYGEN SATURATION: 96 % | BODY MASS INDEX: 23.62 KG/M2 | WEIGHT: 165 LBS | TEMPERATURE: 97.1 F | HEIGHT: 70 IN | DIASTOLIC BLOOD PRESSURE: 92 MMHG

## 2021-01-08 DIAGNOSIS — I44.2 COMPLETE HEART BLOCK (HCC): ICD-10-CM

## 2021-01-08 DIAGNOSIS — I48.21 PERMANENT ATRIAL FIBRILLATION (HCC): Primary | ICD-10-CM

## 2021-01-08 PROCEDURE — 93280 PM DEVICE PROGR EVAL DUAL: CPT | Performed by: INTERNAL MEDICINE

## 2021-01-08 PROCEDURE — 99213 OFFICE O/P EST LOW 20 MIN: CPT | Performed by: INTERNAL MEDICINE

## 2021-01-08 NOTE — PROGRESS NOTES
Tarun Salgado  1945  [phone]    01/08/2021    [facility] CARDIOLOGY     Referring Provider: No ref. provider found     James Zuniga MD  [address] [place] [id number]    Chief Complaint   Patient presents with   • Severe Sinus Bradycardia     Problem List:  1. Severe Bradycardia/CHB              A. PPM implant              B. Generator change 2/1/17 - Dr. Mendoza (Biotronik)  2. Permanent Atrial Fibrillation              A. CHADSVasc = 1              B. Echo 9/17: LVEF 48%, Mild MR/TR              C.Cardiolyte test 9/17: Abnormal myocardial perfusion scan showing fixed defect involving  the basilar anteroseptal segment consistent with bundle branch block/pacing. Myocardial  perfusion imaging indicates no evidence of ischemia.Left ventricular ejection fraction is  normal (Calculated EF = 67%).  3. HTN    Allergies  No Known Allergies    Current Medications    Current Outpatient Medications:   •  aspirin 81 MG EC tablet, Take 81 mg by mouth daily., Disp: , Rfl:   •  dabigatran etexilate (PRADAXA) 150 MG capsu, Take 150 mg by mouth 2 (Two) Times a Day., Disp: , Rfl:   •  Multiple Vitamins-Minerals (MULTIVITAMIN ADULT PO), Take  by mouth., Disp: , Rfl:   •  Niacin ER (SLO-NIACIN) 250 MG tablet controlled-release, Take 250 mg by mouth Daily., Disp: , Rfl:   •  Omega-3 Fatty Acids (FISH OIL) 1000 MG capsule capsule, Take 2,400 mg by mouth Daily With Breakfast., Disp: , Rfl:     History of Present Illness:     Pt presents for follow up of permanent atrial fibrillation, CHB, and PM check. Since we last saw the pt, he has been doing well. He is unaware of his atrial fibrillation and denies SOB, CP, LH, and dizziness, syncope. Denies any hospitalizations, ER visits, bleeding, or TIA/CVA symptoms. Overall feels well. He exercises everyday, rides a stationary bike 3 days per week. Labile BP at home    ROS:  General:  Denies fatigue, weight gain or  "loss  Cardiovascular:  Denies CP, PND, syncope, near syncope, edema or palpitations.  Pulmonary:  Denies SANDERSON, cough, or wheezing      Vitals:    01/08/21 1309   BP: 142/92   BP Location: Left arm   Patient Position: Sitting   Pulse: 70   Temp: 97.1 °F (36.2 °C)   SpO2: 96%   Weight: 74.8 kg (165 lb)   Height: 177.8 cm (70\")     Body mass index is 23.68 kg/m².  PE:  General: NAD  Neck: no JVD, no carotid bruits, no TM  Heart RRR, NL S1, S2, no rubs, murmurs  Lungs: CTA, no wheezes, rhonchi, or rales  Abd: soft, non-tender, NL BS  Ext: No musculoskeletal deformities, no edema, cyanosis, or clubbing  Psych: normal mood and affect    Diagnostic Data:    PM Manual Interrogation: normal function. 100% V paced. 100% afib. Battery 65%.     Procedures    1. Permanent atrial fibrillation (CMS/HCC)    2. Complete heart block (CMS/HCC)          Plan:  1. Permanent Atrial Fibrillation:   - doing well, controlled HRs on pradaxa    2. AVB/Bradycardia:  - normal PM function     3. HTN: monitor at home    F/up in 12 months    Scribed for Greg Alexander MD by Palak Melchor PA-C. 1/8/2021  13:28 EST     I, Greg Alexander MD, personally performed the services described in this documentation as scribed by the above named individual in my presence, and it is both accurate and complete.  1/8/2021  13:33 EST      "

## 2021-02-10 ENCOUNTER — IMMUNIZATION (OUTPATIENT)
Dept: VACCINE CLINIC | Facility: HOSPITAL | Age: 76
End: 2021-02-10

## 2021-02-10 PROCEDURE — 0001A: CPT | Performed by: INTERNAL MEDICINE

## 2021-02-10 PROCEDURE — 91300 HC SARSCOV02 VAC 30MCG/0.3ML IM: CPT | Performed by: INTERNAL MEDICINE

## 2021-03-03 ENCOUNTER — IMMUNIZATION (OUTPATIENT)
Dept: VACCINE CLINIC | Facility: HOSPITAL | Age: 76
End: 2021-03-03

## 2021-03-03 PROCEDURE — 91300 HC SARSCOV02 VAC 30MCG/0.3ML IM: CPT | Performed by: INTERNAL MEDICINE

## 2021-03-03 PROCEDURE — 0002A: CPT | Performed by: INTERNAL MEDICINE

## 2022-01-14 ENCOUNTER — OFFICE VISIT (OUTPATIENT)
Dept: CARDIOLOGY | Facility: CLINIC | Age: 77
End: 2022-01-14

## 2022-01-14 VITALS
HEART RATE: 60 BPM | DIASTOLIC BLOOD PRESSURE: 92 MMHG | OXYGEN SATURATION: 99 % | BODY MASS INDEX: 23.77 KG/M2 | SYSTOLIC BLOOD PRESSURE: 160 MMHG | WEIGHT: 166 LBS | HEIGHT: 70 IN

## 2022-01-14 DIAGNOSIS — I48.21 PERMANENT ATRIAL FIBRILLATION: Primary | ICD-10-CM

## 2022-01-14 DIAGNOSIS — I44.30 AVB (ATRIOVENTRICULAR BLOCK): ICD-10-CM

## 2022-01-14 DIAGNOSIS — I10 PRIMARY HYPERTENSION: ICD-10-CM

## 2022-01-14 PROCEDURE — 99213 OFFICE O/P EST LOW 20 MIN: CPT | Performed by: PHYSICIAN ASSISTANT

## 2022-01-14 PROCEDURE — 93280 PM DEVICE PROGR EVAL DUAL: CPT | Performed by: PHYSICIAN ASSISTANT

## 2022-01-14 RX ORDER — LISINOPRIL 2.5 MG/1
2.5 TABLET ORAL DAILY
COMMUNITY
Start: 2021-11-17 | End: 2023-02-08 | Stop reason: DRUGHIGH

## 2022-01-14 NOTE — PROGRESS NOTES
Tarun Salgado  1945  [phone]    01/14/2022    [facility] CARDIOLOGY     Referring Provider: No ref. provider found     James Zuniga MD  [address] [place] [id number]    Chief Complaint   Patient presents with   • Permanent atrial fibrillation       Problem List:     1. Severe Bradycardia/CHB              A. PPM implant              B. Generator change 2/1/17 - Dr. Mendoza (Biotronik)  2. Permanent Atrial Fibrillation              A. CHADSVasc = 1              B. Echo 9/17: LVEF 48%, Mild MR/TR              C.Cardiolyte test 9/17: Abnormal myocardial perfusion scan showing fixed defect involving         the basilar anteroseptal segment consistent with bundle branch block/pacing. Myocardial       perfusion imaging indicates no evidence of ischemia.Left ventricular ejection fraction is      normal (Calculated EF = 67%).  3. HTN    Allergies  No Known Allergies    Current Medications    Current Outpatient Medications:   •  aspirin 81 MG EC tablet, Take 81 mg by mouth daily., Disp: , Rfl:   •  dabigatran etexilate (PRADAXA) 150 MG capsu, Take 150 mg by mouth 2 (Two) Times a Day., Disp: , Rfl:   •  lisinopril (PRINIVIL,ZESTRIL) 2.5 MG tablet, Take 2.5 mg by mouth Daily., Disp: , Rfl:   •  Multiple Vitamins-Minerals (MULTIVITAMIN ADULT PO), Take 1 tablet by mouth Daily., Disp: , Rfl:   •  Niacin ER (SLO-NIACIN) 250 MG tablet controlled-release, Take 250 mg by mouth Daily., Disp: , Rfl:   •  Omega-3 Fatty Acids (fish oil) 1200 MG capsule capsule, Take 2,400 mg by mouth Daily With Breakfast., Disp: , Rfl:     History of Present Illness:     Pt presents for follow up of atrial fibrillation, AVB/bradycardia with PM check, and HTN. Since we last saw the pt, pt denies any AF episodes, SOB, CP, LH, and dizziness. Denies any hospitalizations, ER visits, bleeding, or TIA/CVA symptoms. Overall feels well.    ROS:  General:  Denies fatigue, weight gain or loss  Cardiovascular:   "Denies CP, PND, syncope, near syncope, edema or palpitations.  Pulmonary:  Denies SANDERSON, cough, or wheezing      Vitals:    01/14/22 1044   BP: 160/92   BP Location: Right arm   Patient Position: Sitting   Cuff Size: Adult   Pulse: 60   SpO2: 99%   Weight: 75.3 kg (166 lb)   Height: 177.8 cm (70\")     Body mass index is 23.82 kg/m².  PE:  General: NAD. A & O x 3   Neck: no JVD, no carotid bruits, no TM  Heart RRR, NL S1, S2, S4 present, no rubs, murmurs  Lungs: CTA, no wheezes, rhonchi, or rales  Abd: soft, non-tender, NL BS  Ext: No musculoskeletal deformities, no edema, cyanosis, or clubbing  Psych: normal mood and affect    Diagnostic Data:    PM Manual Interrogation: normal function. 100% AFIB, changed to VVIR today. Battery 4 years.     Procedures    1. Permanent atrial fibrillation (HCC)    2. AVB (atrioventricular block)    3. Primary hypertension          Plan:  1. Permanent Atrial Fibrillation:   - doing well, controlled HRs on pradaxa     2. AVB/Bradycardia:  - normal PM function. Changed to VVIR today. Discussed with patient about getting a home monitor, but he declines.      3. HTN: now on Lisinopril 2.5 mg daily, but needs higher dose. He will talk to his PCP about this. He declines increasing the dose today.     F/up in 12 months    Electronically signed by CHRISTIAN Ramos, 01/14/22, 10:55 AM EST.    "

## 2023-02-08 ENCOUNTER — HOSPITAL ENCOUNTER (OUTPATIENT)
Dept: CARDIOLOGY | Facility: HOSPITAL | Age: 78
Discharge: HOME | End: 2023-02-08
Admitting: INTERNAL MEDICINE
Payer: MEDICARE

## 2023-02-08 ENCOUNTER — CLINICAL SUPPORT NO REQUIREMENTS (OUTPATIENT)
Dept: CARDIOLOGY | Facility: CLINIC | Age: 78
End: 2023-02-08
Payer: MEDICARE

## 2023-02-08 ENCOUNTER — OFFICE VISIT (OUTPATIENT)
Dept: CARDIOLOGY | Facility: CLINIC | Age: 78
End: 2023-02-08
Payer: MEDICARE

## 2023-02-08 VITALS
HEIGHT: 70 IN | WEIGHT: 175 LBS | BODY MASS INDEX: 25.05 KG/M2 | SYSTOLIC BLOOD PRESSURE: 142 MMHG | HEART RATE: 64 BPM | DIASTOLIC BLOOD PRESSURE: 90 MMHG

## 2023-02-08 VITALS — HEART RATE: 54 BPM

## 2023-02-08 DIAGNOSIS — R00.1 SEVERE SINUS BRADYCARDIA: Primary | ICD-10-CM

## 2023-02-08 DIAGNOSIS — I44.30 AVB (ATRIOVENTRICULAR BLOCK): ICD-10-CM

## 2023-02-08 DIAGNOSIS — I48.3 TYPICAL ATRIAL FLUTTER: ICD-10-CM

## 2023-02-08 DIAGNOSIS — I48.21 PERMANENT ATRIAL FIBRILLATION: ICD-10-CM

## 2023-02-08 LAB
AORTIC ARCH: 2.1 CM
ASCENDING AORTA: 4 CM
BH CV ECHO MEAS - ACS: 2.4 CM
BH CV ECHO MEAS - AO MAX PG: 5.3 MMHG
BH CV ECHO MEAS - AO MEAN PG: 2.7 MMHG
BH CV ECHO MEAS - AO ROOT DIAM: 4 CM
BH CV ECHO MEAS - AO V2 MAX: 115.2 CM/SEC
BH CV ECHO MEAS - AO V2 VTI: 22.6 CM
BH CV ECHO MEAS - AVA(I,D): 3.4 CM2
BH CV ECHO MEAS - EDV(CUBED): 124.4 ML
BH CV ECHO MEAS - EDV(MOD-SP2): 103 ML
BH CV ECHO MEAS - EDV(MOD-SP4): 105 ML
BH CV ECHO MEAS - EF(MOD-BP): 53.5 %
BH CV ECHO MEAS - EF(MOD-SP2): 49.5 %
BH CV ECHO MEAS - EF(MOD-SP4): 54.3 %
BH CV ECHO MEAS - ESV(CUBED): 46.1 ML
BH CV ECHO MEAS - ESV(MOD-SP2): 52 ML
BH CV ECHO MEAS - ESV(MOD-SP4): 48 ML
BH CV ECHO MEAS - FS: 28.2 %
BH CV ECHO MEAS - IVS/LVPW: 1.36 CM
BH CV ECHO MEAS - IVSD: 1.39 CM
BH CV ECHO MEAS - LAT PEAK E' VEL: 13.8 CM/SEC
BH CV ECHO MEAS - LV DIASTOLIC VOL/BSA (35-75): 53.2 CM2
BH CV ECHO MEAS - LV MASS(C)D: 235.3 GRAMS
BH CV ECHO MEAS - LV MAX PG: 4.9 MMHG
BH CV ECHO MEAS - LV MEAN PG: 2.43 MMHG
BH CV ECHO MEAS - LV SYSTOLIC VOL/BSA (12-30): 24.3 CM2
BH CV ECHO MEAS - LV V1 MAX: 110.5 CM/SEC
BH CV ECHO MEAS - LV V1 VTI: 21.5 CM
BH CV ECHO MEAS - LVIDD: 5 CM
BH CV ECHO MEAS - LVIDS: 3.6 CM
BH CV ECHO MEAS - LVOT AREA: 3.5 CM2
BH CV ECHO MEAS - LVOT DIAM: 2.13 CM
BH CV ECHO MEAS - LVPWD: 1.02 CM
BH CV ECHO MEAS - MED PEAK E' VEL: 10.3 CM/SEC
BH CV ECHO MEAS - MR MAX PG: 109.6 MMHG
BH CV ECHO MEAS - MR MAX VEL: 523.4 CM/SEC
BH CV ECHO MEAS - MV A DUR: 0.11 SEC
BH CV ECHO MEAS - MV A MAX VEL: 36 CM/SEC
BH CV ECHO MEAS - MV DEC SLOPE: 801.8 CM/SEC2
BH CV ECHO MEAS - MV DEC TIME: 0.13 MSEC
BH CV ECHO MEAS - MV E MAX VEL: 91.7 CM/SEC
BH CV ECHO MEAS - MV E/A: 2.5
BH CV ECHO MEAS - MV MAX PG: 4.1 MMHG
BH CV ECHO MEAS - MV MEAN PG: 1.71 MMHG
BH CV ECHO MEAS - MV P1/2T: 38.7 MSEC
BH CV ECHO MEAS - MV V2 VTI: 20.7 CM
BH CV ECHO MEAS - MVA(P1/2T): 5.7 CM2
BH CV ECHO MEAS - MVA(VTI): 3.7 CM2
BH CV ECHO MEAS - PA ACC TIME: 0.07 SEC
BH CV ECHO MEAS - PA PR(ACCEL): 49.3 MMHG
BH CV ECHO MEAS - PA V2 MAX: 82.4 CM/SEC
BH CV ECHO MEAS - QP/QS: 0.83
BH CV ECHO MEAS - RV MAX PG: 2.07 MMHG
BH CV ECHO MEAS - RV V1 MAX: 72 CM/SEC
BH CV ECHO MEAS - RV V1 VTI: 13.5 CM
BH CV ECHO MEAS - RVOT DIAM: 2.45 CM
BH CV ECHO MEAS - SI(MOD-SP2): 25.9 ML/M2
BH CV ECHO MEAS - SI(MOD-SP4): 28.9 ML/M2
BH CV ECHO MEAS - SV(LVOT): 76.3 ML
BH CV ECHO MEAS - SV(MOD-SP2): 51 ML
BH CV ECHO MEAS - SV(MOD-SP4): 57 ML
BH CV ECHO MEAS - SV(RVOT): 63.6 ML
BH CV ECHO MEAS - TAPSE (>1.6): 1.75 CM
BH CV ECHO MEASUREMENTS AVERAGE E/E' RATIO: 7.61
BH CV XLRA - RV BASE: 3.1 CM
BH CV XLRA - RV LENGTH: 7.9 CM
BH CV XLRA - RV MID: 2.23 CM
BH CV XLRA - TDI S': 12.9 CM/SEC
LEFT ATRIUM VOLUME INDEX: 47 ML/M2
LV DP/DT: 866 MMHG/SEC
MAXIMAL PREDICTED HEART RATE: 141 BPM
SINUS: 4.2 CM
STJ: 3.8 CM
STRESS TARGET HR: 120 BPM

## 2023-02-08 PROCEDURE — 93306 TTE W/DOPPLER COMPLETE: CPT | Performed by: INTERNAL MEDICINE

## 2023-02-08 PROCEDURE — 93000 ELECTROCARDIOGRAM COMPLETE: CPT | Performed by: INTERNAL MEDICINE

## 2023-02-08 PROCEDURE — 93306 TTE W/DOPPLER COMPLETE: CPT

## 2023-02-08 PROCEDURE — 99214 OFFICE O/P EST MOD 30 MIN: CPT | Performed by: INTERNAL MEDICINE

## 2023-02-08 PROCEDURE — 93279 PRGRMG DEV EVAL PM/LDLS PM: CPT | Performed by: INTERNAL MEDICINE

## 2023-02-08 RX ORDER — LISINOPRIL 10 MG/1
1 TABLET ORAL DAILY
COMMUNITY
Start: 2022-11-08
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
## 2023-03-08 ENCOUNTER — TELEPHONE (OUTPATIENT)
Dept: CARDIOLOGY | Facility: CLINIC | Age: 78
End: 2023-03-08
Payer: MEDICARE

## 2023-03-08 NOTE — TELEPHONE ENCOUNTER
----- Message from Tarun Salgado sent at 3/8/2023 10:56 AM EST -----  Regarding: Heart monitor  Contact: [phone]  I received the heart monitor and have it on my night stand per the directions.  Is there anything I must do to know it is working properly?  Please advise.  Thank you.  Tarun Salgado

## 2023-03-09 NOTE — TELEPHONE ENCOUNTER
Called and spoke with pt to let him know monitor is connected in [facility] website and explained how it monitors and sends reports. He verbalized understanding.

## 2023-09-07 PROCEDURE — 93294 REM INTERROG EVL PM/LDLS PM: CPT | Performed by: INTERNAL MEDICINE

## 2023-09-07 PROCEDURE — 93296 REM INTERROG EVL PM/IDS: CPT | Performed by: INTERNAL MEDICINE

## 2023-09-11 ENCOUNTER — OFFICE VISIT (OUTPATIENT)
Dept: CARDIOLOGY | Facility: CLINIC | Age: 78
End: 2023-09-11
Payer: MEDICARE

## 2023-09-11 ENCOUNTER — CLINICAL SUPPORT NO REQUIREMENTS (OUTPATIENT)
Dept: CARDIOLOGY | Facility: CLINIC | Age: 78
End: 2023-09-11
Payer: MEDICARE

## 2023-09-11 VITALS
BODY MASS INDEX: 24.62 KG/M2 | WEIGHT: 172 LBS | HEIGHT: 70 IN | SYSTOLIC BLOOD PRESSURE: 130 MMHG | DIASTOLIC BLOOD PRESSURE: 90 MMHG | HEART RATE: 50 BPM

## 2023-09-11 DIAGNOSIS — Z95.0 PACEMAKER: ICD-10-CM

## 2023-09-11 DIAGNOSIS — I44.30 AVB (ATRIOVENTRICULAR BLOCK): Primary | ICD-10-CM

## 2023-09-11 DIAGNOSIS — I10 PRIMARY HYPERTENSION: ICD-10-CM

## 2023-09-11 DIAGNOSIS — I48.21 PERMANENT ATRIAL FIBRILLATION: ICD-10-CM

## 2023-09-11 PROCEDURE — 93000 ELECTROCARDIOGRAM COMPLETE: CPT | Performed by: NURSE PRACTITIONER

## 2023-09-11 PROCEDURE — 99213 OFFICE O/P EST LOW 20 MIN: CPT | Performed by: NURSE PRACTITIONER

## 2023-09-11 PROCEDURE — 3080F DIAST BP >= 90 MM HG: CPT | Performed by: NURSE PRACTITIONER

## 2023-09-11 PROCEDURE — 1159F MED LIST DOCD IN RCRD: CPT | Performed by: NURSE PRACTITIONER

## 2023-09-11 PROCEDURE — 3075F SYST BP GE 130 - 139MM HG: CPT | Performed by: NURSE PRACTITIONER

## 2023-09-11 PROCEDURE — 1160F RVW MEDS BY RX/DR IN RCRD: CPT | Performed by: NURSE PRACTITIONER
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
## 2023-12-06 ENCOUNTER — TELEPHONE (OUTPATIENT)
Dept: CARDIOLOGY | Facility: CLINIC | Age: 78
End: 2023-12-06
Payer: MEDICARE

## 2023-12-06 NOTE — TELEPHONE ENCOUNTER
Caller: Tarun Salgado    Relationship to patient: Self    Best call back number: [phone]    Chief complaint: PT HAS NOT BEEN SEEN IN OFFICE SINCE 01.08.21, HE WOULD LIKE TO GET AN APPOINTMENT FOR A ROUTINE CHECK UP. PLEASE REACH OUT TO PT TO SCHEDULE.     Type of visit: FOLLOW UP    Requested date: AFTER THE FIRST OF THE YEAR      Additional notes:PT WILL BE SWITCHING TO ANTHEM AFTER 12.31.23.

## (undated) DEVICE — MEDI-VAC YANKAUER SUCTION HANDLE W/BULBOUS TIP: Brand: CARDINAL HEALTH

## (undated) DEVICE — 3M™ STERI-STRIP™ REINFORCED ADHESIVE SKIN CLOSURES, R1547, 1/2 IN X 4 IN (12 MM X 100 MM), 6 STRIPS/ENVELOPE: Brand: 3M™ STERI-STRIP™

## (undated) DEVICE — LIMB HOLDERS: Brand: DEROYAL

## (undated) DEVICE — IRRIGATOR BULB ASEPTO 60CC STRL

## (undated) DEVICE — LEX ELECTRO PHYSIOLOGY: Brand: MEDLINE INDUSTRIES, INC.

## (undated) DEVICE — DECANTER: Brand: UNBRANDED

## (undated) DEVICE — CAUTERY TIP POLISHER: Brand: DEVON

## (undated) DEVICE — ST INF PRI SMRTSTE 20DRP 2VLV 24ML 117

## (undated) DEVICE — TUBING, SUCTION, 1/4" X 10', STRAIGHT: Brand: MEDLINE

## (undated) DEVICE — PENCL E/S HNDSWCH ROCKRBTN HOLSTR 10FT

## (undated) DEVICE — ST EXT IV SMARTSITE 2VLV SP M LL 5ML IV1

## (undated) DEVICE — ELECTRD BLD EZ CLN STD 2.5IN

## (undated) DEVICE — DECANT BG O JET

## (undated) DEVICE — SET PRIMARY GRVTY 10DP MALE LL 104IN

## (undated) DEVICE — 3M™ TEGADERM™ CHG DRESSING 25/CARTON 4 CARTONS/CASE 1659: Brand: TEGADERM™

## (undated) DEVICE — SOL NACL 0.9PCT 1000ML

## (undated) DEVICE — CANN NASL CO2 DIVIDED A/

## (undated) DEVICE — ADULT, W/LG. BACK PAD, RADIOTRANSPARENT ELEMENT AND LEAD WIRE: Brand: DEFIBRILLATION ELECTRODES